# Patient Record
Sex: MALE | Race: WHITE
[De-identification: names, ages, dates, MRNs, and addresses within clinical notes are randomized per-mention and may not be internally consistent; named-entity substitution may affect disease eponyms.]

---

## 2018-12-28 ENCOUNTER — HOSPITAL ENCOUNTER (OUTPATIENT)
Dept: HOSPITAL 35 - GI | Age: 73
Discharge: HOME | End: 2018-12-28
Attending: SPECIALIST
Payer: COMMERCIAL

## 2018-12-28 VITALS — WEIGHT: 155 LBS | BODY MASS INDEX: 23.49 KG/M2 | HEIGHT: 67.99 IN

## 2018-12-28 DIAGNOSIS — K63.5: ICD-10-CM

## 2018-12-28 DIAGNOSIS — Z88.6: ICD-10-CM

## 2018-12-28 DIAGNOSIS — D12.8: ICD-10-CM

## 2018-12-28 DIAGNOSIS — Z98.890: ICD-10-CM

## 2018-12-28 DIAGNOSIS — K21.0: ICD-10-CM

## 2018-12-28 DIAGNOSIS — Z86.010: ICD-10-CM

## 2018-12-28 DIAGNOSIS — Z87.19: ICD-10-CM

## 2018-12-28 DIAGNOSIS — Z79.899: ICD-10-CM

## 2018-12-28 DIAGNOSIS — Z80.0: ICD-10-CM

## 2018-12-28 DIAGNOSIS — D12.5: ICD-10-CM

## 2018-12-28 DIAGNOSIS — Z12.11: Primary | ICD-10-CM

## 2018-12-28 DIAGNOSIS — K57.30: ICD-10-CM

## 2018-12-28 DIAGNOSIS — I10: ICD-10-CM

## 2018-12-28 DIAGNOSIS — E78.5: ICD-10-CM

## 2018-12-28 DIAGNOSIS — K22.2: ICD-10-CM

## 2018-12-28 PROCEDURE — 62900: CPT

## 2018-12-28 PROCEDURE — 62110: CPT

## 2018-12-31 NOTE — PATH
[facility]
[address]
[place]   [address]                     PATHOLOGY RPT PROCEDURE       
_______________________________________________________________________________
 
Name:       ACE ROBERTS                Room #:                     REG HAMZAH GARCIA#:      [id number]     Account #:      [id number]  
Admission:  12/28/18    Date of Birth:  [birth year]  
Discharge:                                              Report #:    [id number]
                                                        Path Case #: [id number]
_______________________________________________________________________________
 
LCA Accession Number: [id number]
.                                                                01
Material submitted:                                        .
PART A: SIGMOID COLON POLYP X 2
PART B: RECTAL POLYP X 2
.                                                                01
Clinical history:                                          .
Pre-OP DX: Dysphagia, GERD, Hx polyps
Post-OP DX: Colon polyps, rectal polyps, diverticulosis, esophagitis,
dysplasia
.                                                                02
**********************************************************************
Diagnosis:
A. Polyp x 2, sigmoid colon polyp, endoscopic biopsy:
- One fragment showing tubular adenoma without high grade dysplasia.
- Remainder of fragments showing hyperplastic polyp without dysplasia.
.
B. Polyp x 2, rectal polyp, endoscopic biopsy:
- Both fragments showing tubular adenoma.
- Negative for high grade dysplasia.
(IUV/db; 12/31/18)
LBQ/12/31/2018
**********************************************************************
.                                                                02
Electronically signed:                                     .
Kiersten Espinosa MD, Pathologist
NPI- [id number]
.                                                                01
Gross description:                                         .
A.  Received in formalin labeled "Ace Roberts, MEGHNA polyp at sigmoid colon
x2," are 4 segments of tan soft tissue measuring 1.3 x 0.9 x 0.3 cm in
aggregate dimensions and ranging from 0.3 to 0.5 cm in maximum dimension.
The specimen is submitted entirely in cassette A1.
.
B.  Received in formalin labeled "cAe Roberts, MEGHNA rectal polyp x2," are
2 segments of tan soft tissue measuring 0.7 x 0.3 x 0.2 cm in aggregate
dimensions and ranging from 0.3 to 0.4 cm in maximum dimension.  The
specimen is submitted entirely in cassette B1.
(TSD; 12/28/2018)
TOB/TOB
.                                                                02
Pathologist provided ICD-10:
D12.5, K63.5, D12.8
.                                                                02
CPT                                                        .
502455, 240279
Specimen Comment: A courtesy copy of this report has been sent to
 
 
[place] [address]                     PATHOLOGY RPT PROCEDURE       
_______________________________________________________________________________
 
Name:       ACE ROBERTS                Room #:                     REG HAMZAH GARCIA#:      [id number]     Account #:      [id number]  
Admission:  12/28/18    Date of Birth:  [birth year]  
Discharge:                                              Report #:    [id number]
                                                        Path Case #: [id number]
_______________________________________________________________________________
Specimen Comment: [phone], [phone].
Specimen Comment: Report sent to  / DR RUEDA
***Performed at:  01
   [address], [place], [place]  [id number]
   MD Martin Phillips MD Phone:  [phone]
***Performed at:  02
   [address]  [id number]
   MD Kiersten Espinosa MD Phone:  [phone]

## 2019-02-08 ENCOUNTER — HOSPITAL ENCOUNTER (OUTPATIENT)
Dept: HOSPITAL 35 - GI | Age: 74
Discharge: HOME | End: 2019-02-08
Attending: SPECIALIST
Payer: COMMERCIAL

## 2019-02-08 VITALS — BODY MASS INDEX: 24.25 KG/M2 | WEIGHT: 160 LBS | HEIGHT: 67.99 IN

## 2019-02-08 DIAGNOSIS — Z88.6: ICD-10-CM

## 2019-02-08 DIAGNOSIS — R13.19: ICD-10-CM

## 2019-02-08 DIAGNOSIS — Z79.899: ICD-10-CM

## 2019-02-08 DIAGNOSIS — K22.70: Primary | ICD-10-CM

## 2019-02-08 DIAGNOSIS — K21.0: ICD-10-CM

## 2019-02-08 DIAGNOSIS — E78.00: ICD-10-CM

## 2019-02-08 DIAGNOSIS — Z98.890: ICD-10-CM

## 2019-02-08 DIAGNOSIS — I10: ICD-10-CM

## 2019-02-08 PROCEDURE — 62110: CPT

## 2019-02-08 PROCEDURE — 62900: CPT

## 2019-02-08 NOTE — P
[facility]
[address]
[place]   [address]                     PROCEDURE REPORT              
_______________________________________________________________________________
 
Name:       ACE FISCHER                Room #:                     REG [employer]#:      [id number]                       Account #:      [id number]  
Admission:  02/08/19    Attend Phys:    Tolu Caldwell
Discharge:              Date of Birth:  [birth year]  
                                                          Report #: [id number]
                                                                    [id number]   
_______________________________________________________________________________
THIS REPORT FOR:   //name//                          
 
CC: Tolu Hugo
 
DATE OF SERVICE:  02/08/2019
 
 
PROCEDURE PERFORMED:  Upper endoscopy with biopsies and esophageal dilation.
 
HISTORY OF PRESENT ILLNESS:  The patient is a 73-year-old male with a history of
gastroesophageal reflux disease, who underwent an upper endoscopy by myself on
12/28/2018 for dysphagia and heartburn at that time.  He was noted to have grade
C erosive esophagitis, an inlet patch was noted in the upper esophagus.  There
was mild narrowing in the distal esophagus.  Savary dilation with 48-Lebanese was
performed and the patient was started on daily PPI therapy.  He now presents for
a routine followup to rule out the possibility of Shin's.  He does report
intermittent dysphagia still at this time.  He reports improvement in his
heartburn symptoms, but continues to have intermittent heartburn on a daily
basis.
 
DESCRIPTION OF PROCEDURE:  The risks and benefits of the procedure were
explained to the patient, those risks including but not limited to bleeding,
perforation, the risk of sedation.  He understood these risks and gave informed
consent.  Sedation was given using propofol per anesthesia.  Next, using a
standard Olympus upper endoscope, the scope was placed in the patient's mouth
and advanced under direct vision through the esophagus, stomach and into the
second portion of the duodenum.  The larynx was normal in appearance.  In the
proximal esophagus, inlet patch was once again noted.  There was a mild
narrowing extrinsically from the aorta in this area.  The scope passed through
without difficulty.  Biopsies of the inlet patch were obtained.  The mid
esophagus was normal.  In the distal esophagus at the GE junction, no further
esophagitis was noted; however, 2 small patches of possible Shin's were
noted.  Biopsies were obtained.  No stricture was seen.  Overall, the gastric
mucosa was normal.  The pylorus was normal and patent.  The duodenal bulb, first
and second portion were all normal.  The scope was then brought back up into the
patient's stomach and a Savary guidewire was inserted through the scope, leaving
the guidewire in place as the scope was then removed.  Next, a 51-Lebanese Savary
dilation of the esophagus was performed without difficulty.  The wire and
dilator removed.  The scope was reintroduced into the patient's stomach.  There
was no evidence of mucosal tear after dilation.  The scope was then withdrawn
and the procedure terminated.  The patient tolerated the procedure well.
 
IMPRESSION:
1.  Inlet patch, proximal esophagus.
2.  Previous grade C erosive esophagitis well healed, but possible short segment
 
 
 
[facility]
[address] [place]   [address]                     PROCEDURE REPORT              
_______________________________________________________________________________
 
Name:       ACE FISCHER                Room #:                     REG HAMZAH GARCIA#:      [id number]                       Account #:      [id number]  
Admission:  02/08/19    Attend Phys:    Tolu Caldwell
Discharge:              Date of Birth:  [birth year]  
                                                          Report #: [id number]
                                                                    [id number]   
_______________________________________________________________________________
Shin's.  Biopsies obtained.
3.  Otherwise, normal upper endoscopy.
 
RECOMMENDATIONS:
1.  Await biopsy results.
2.  We discussed possible trial of b.i.d. PPI therapy at this time as the
patient continues to have intermittent heartburn symptoms.
3.  If no improvement in dysphagia, may consider esophageal manometry in the
future.
 
Thank you for allowing me to participate in his care.
 
 
 
 
 
 
 
 
 
 
 
 
 
 
 
 
 
 
 
 
 
 
 
 
 
 
 
 
 
 
 
 
 
                         
   By:                               
                   
D: 02/08/19 0917                           _____________________________________
T: 02/08/19 2035                           Tolu Joya MD     /nt

## 2019-02-11 NOTE — PATH
[facility]
[address]
[place]   [address]                     PATHOLOGY RPT PROCEDURE       
_______________________________________________________________________________
 
Name:       CAE ROBERTS                Room #:                     REG HAMZAH GARCIA#:      [id number]     Account #:      [id number]  
Admission:  02/08/19    Date of Birth:  [birth year]  
Discharge:                                              Report #:    [id number]
                                                        Path Case #: [id number]
_______________________________________________________________________________
 
LCA Accession Number: [id number]
.                                                                01
Material submitted:                                        .
PART A: BIOPSY DISTAL ESOPHAGUS R/O BARRETTS
PART B: BIOPSY INLET PATCH PROXIMAL ESOPHAGUS
.                                                                01
Clinical history:                                          .
Pre-OP DX: 6 week follow-up (grade c esophagitis) dysphagia
Post-OP DX: Reflux, dysphagia, healed esophagitis
.                                                                02
**********************************************************************
Diagnosis:
A.  Gastroesophageal mucosa, distal esophagus, rule out Shin's,
endoscopic biopsy:
- Specialized columnar epithelium (gastric cardia-type mucosa) with
intestinal metaplasia, compatible with Shin's metaplasia.
- Squamous mucosa with mild esophagitis.
- Negative for dysplasia (please see comment).
.
B.  Gastric cardia-type mucosa, inlet patch proximal esophagus, endoscopic
biopsy:
- Moderate chronic inflammation within gastric cardia-type mucosa,
consistent with an inlet patch.
- Negative for intestinal metaplasia or dysplasia.
.
(IUV:marcela; 02/11/2019)
MBR/02/11/2019
**********************************************************************
.                                                                02
Comment:
Dr. Zac Otoole has seen representative slides of this case and concurs
with my diagnosis.
.
The above diagnosis of Shin's esophagus is made due to presence of
intestinal metaplasia and with the assumption that the biopsies were
obtained from the columnar mucosa in the distal esophagus located at least
1 cm proximal to the top of the gastric folds as per the 2016 ACG
guidelines.
.
(IUV:; 02/11/2019)
.                                                                02
Electronically signed:                                     .
Kiersten Espinosa MD, Pathologist
NPI- [id number]
.                                                                01
Gross description:                                         .
A.  Received in formalin labeled "Ace Roberts BX, distal esophagus,
 
 
[place] [address]                     PATHOLOGY RPT PROCEDURE       
_______________________________________________________________________________
 
Name:       ACE ROBERTS                [id number]     Account #:      [id number]  
Admission:  02/08/19    Date of Birth:  [birth year]  
Discharge:                                              Report #:    [id number]
                                                        Path Case #: [id number]
_______________________________________________________________________________
rule out Shin's," are 2 segments of tan soft tissue measuring 0.9 x 0.2
x 0.2 cm in aggregate dimensions and ranging from 0.4 to 0.5 cm in maximum
dimension.  The specimen is submitted entirely in cassette A1.
.
B.  Received in formalin labeled "Armando, Ace, BX inlet patch," and
additionally labeled on the requisition as "proximal esophagus," is a
single segment of tan soft tissue measuring 0.6 cm in maximum dimension.
The specimen is entirely submitted in cassette B1.
(TSD; 2/8/2019)
TOB/TOB
.                                                                02
Pathologist provided ICD-10:
K22.70, K20.9, R13.10
.                                                                02
CPT                                                        .
804552, 915350
Specimen Comment: A courtesy copy of this report has been sent to
Specimen Comment: [phone], [phone].
Specimen Comment: Report sent to  / DR RUEDA
***Performed at:  01
   [address], [place]  [id number]
   MD Matrin Phillips MD Phone:  [phone]
***Performed at:  02
   [address]  [id number]
   MD Kiersten Espinosa MD Phone:  [phone]

## 2020-02-07 ENCOUNTER — HOSPITAL ENCOUNTER (OUTPATIENT)
Dept: HOSPITAL 35 - GI | Age: 75
Discharge: HOME | End: 2020-02-07
Attending: SPECIALIST
Payer: COMMERCIAL

## 2020-02-07 VITALS — BODY MASS INDEX: 23.49 KG/M2 | HEIGHT: 67.99 IN | WEIGHT: 155 LBS

## 2020-02-07 DIAGNOSIS — Z88.6: ICD-10-CM

## 2020-02-07 DIAGNOSIS — I10: ICD-10-CM

## 2020-02-07 DIAGNOSIS — K22.70: Primary | ICD-10-CM

## 2020-02-07 DIAGNOSIS — E78.5: ICD-10-CM

## 2020-02-07 DIAGNOSIS — R13.19: ICD-10-CM

## 2020-02-07 DIAGNOSIS — K21.9: ICD-10-CM

## 2020-02-07 DIAGNOSIS — Z79.899: ICD-10-CM

## 2020-02-07 DIAGNOSIS — Z98.890: ICD-10-CM

## 2020-02-07 PROCEDURE — 62110: CPT

## 2020-02-07 PROCEDURE — 62900: CPT

## 2020-02-12 NOTE — P
[facility]
[address]
[place]   [address]                     PROCEDURE REPORT              
_______________________________________________________________________________
 
Name:       ACE FISCHER                Room #:                     REG [facility] 
RADHA#:      [id number]                       Account #:      [id number]  
Admission:  02/07/20    Attend Phys:    Tolu Caldwell
Discharge:              Date of Birth:  [birth year]  
                                                          Report #: [id number]
                                                                    [id number]   
_______________________________________________________________________________
THIS REPORT FOR:  
 
cc:  Arnulfo Hugo MD, John R. MD McElhinney, Christian C. MD                                   ~
CC: Tolu Hugo
 
DATE OF SERVICE:  02/07/2020
 
 
PROCEDURE PERFORMED:  Upper endoscopy with biopsies and esophageal dilation.
 
HISTORY OF PRESENT ILLNESS:  The patient is a 74-year-old male with a history of
gastroesophageal reflux disease, Shin's, here for a 1 year followup.  He does
report some mild dysphagia at times.  He is taking Prilosec on a daily basis. 
He continues to have breakthrough heartburn at times.
 
DESCRIPTION OF PROCEDURE:  The risks and benefits of the procedure were
explained to the patient, those risks including but not limited to bleeding,
perforation and the risk of sedation.  He understood these risks and gave
informed consent.  Sedation was given using propofol per anesthesia.  Next,
using a standard Olympus upper endoscope, the scope was placed in the patient's
mouth and advanced under direct vision through the esophagus, stomach and into
the second portion of the duodenum.  The larynx was normal in appearance.  In
the upper esophagus, an inlet patch was once again noted.  Mid esophagus was
normal.  A small segment of Shin's was noted in the distal esophagus. 
Biopsies were obtained.  Overall, the gastric mucosa was normal.  The pylorus
was normal and patent.  The duodenal bulb, first and second portion were all
normal.  The scope was then brought back up into the patient's stomach and a
Savary guidewire was inserted through the scope, leaving the guidewire in place
as the scope was then withdrawn.  Next, a 48-Iraqi Savary dilation of the
esophagus was then performed without difficulty.  The wire and dilator were
removed.  The scope was reintroduced into the patient's stomach.  There was no
evidence of mucosal tear after dilation.  The scope was then withdrawn and the
procedure terminated.  The patient tolerated the procedure well.
 
IMPRESSION:
1.  Short segment Shin's esophagus.
2.  Inlet patch in the proximal esophagus.
3.  Otherwise, normal upper endoscopy.
 
RECOMMENDATIONS:
1.  Await biopsy results.
2.  Observe the patient post-dilation.
 
 
 
[address]   [address]                     PROCEDURE REPORT              
_______________________________________________________________________________
 
Name:       ACE FISCHER                Room #:                     REG HAMZAH GARCIA#:      [id number]                       Account #:      [id number]  
Admission:  02/07/20    Attend Phys:    Tolu Caldwell
Discharge:              Date of Birth:  [birth year]  
                                                          Report #: [id number]
                                                                    [id number]   
_______________________________________________________________________________
3.  We discussed switching to Protonix to see if this controls his heartburn
symptoms better.
 
Thank you for allowing me to participate in his care.
 
 
 
 
 
 
 
 
 
 
 
 
 
 
 
 
 
 
 
 
 
 
 
 
 
 
 
 
 
 
 
 
 
 
 
 
 
 
 
 
  <ELECTRONICALLY SIGNED>
   By: Tolu Joya MD   
  02/12/20     0946
D: 02/07/20 0917                           _____________________________________
T: 02/07/20 0930                           Tolu Joya MD     /nt